# Patient Record
Sex: MALE | Race: WHITE | NOT HISPANIC OR LATINO
[De-identification: names, ages, dates, MRNs, and addresses within clinical notes are randomized per-mention and may not be internally consistent; named-entity substitution may affect disease eponyms.]

---

## 2018-03-20 PROBLEM — Z00.00 ENCOUNTER FOR PREVENTIVE HEALTH EXAMINATION: Status: ACTIVE | Noted: 2018-03-20

## 2018-03-28 ENCOUNTER — APPOINTMENT (OUTPATIENT)
Dept: NEPHROLOGY | Facility: CLINIC | Age: 63
End: 2018-03-28

## 2021-03-13 ENCOUNTER — NON-APPOINTMENT (OUTPATIENT)
Age: 66
End: 2021-03-13

## 2024-01-25 ENCOUNTER — NON-APPOINTMENT (OUTPATIENT)
Age: 69
End: 2024-01-25

## 2024-02-01 ENCOUNTER — NON-APPOINTMENT (OUTPATIENT)
Age: 69
End: 2024-02-01

## 2024-02-05 ENCOUNTER — NON-APPOINTMENT (OUTPATIENT)
Age: 69
End: 2024-02-05

## 2024-02-05 ENCOUNTER — APPOINTMENT (OUTPATIENT)
Dept: SURGICAL ONCOLOGY | Facility: CLINIC | Age: 69
End: 2024-02-05
Payer: COMMERCIAL

## 2024-02-05 VITALS
WEIGHT: 203 LBS | DIASTOLIC BLOOD PRESSURE: 74 MMHG | HEIGHT: 64 IN | OXYGEN SATURATION: 98 % | SYSTOLIC BLOOD PRESSURE: 145 MMHG | BODY MASS INDEX: 34.66 KG/M2 | RESPIRATION RATE: 17 BRPM | HEART RATE: 69 BPM

## 2024-02-05 DIAGNOSIS — D03.4 MELANOMA IN SITU OF SCALP AND NECK: ICD-10-CM

## 2024-02-05 PROCEDURE — 99205 OFFICE O/P NEW HI 60 MIN: CPT

## 2024-02-15 ENCOUNTER — OUTPATIENT (OUTPATIENT)
Dept: OUTPATIENT SERVICES | Facility: HOSPITAL | Age: 69
LOS: 1 days | End: 2024-02-15
Payer: COMMERCIAL

## 2024-02-15 ENCOUNTER — APPOINTMENT (OUTPATIENT)
Dept: PLASTIC SURGERY | Facility: CLINIC | Age: 69
End: 2024-02-15

## 2024-02-15 ENCOUNTER — RESULT REVIEW (OUTPATIENT)
Age: 69
End: 2024-02-15

## 2024-02-15 VITALS
DIASTOLIC BLOOD PRESSURE: 70 MMHG | OXYGEN SATURATION: 96 % | RESPIRATION RATE: 16 BRPM | HEIGHT: 64 IN | SYSTOLIC BLOOD PRESSURE: 144 MMHG | TEMPERATURE: 96.8 F | WEIGHT: 201 LBS | BODY MASS INDEX: 34.31 KG/M2 | HEART RATE: 72 BPM

## 2024-02-15 DIAGNOSIS — Z83.3 FAMILY HISTORY OF DIABETES MELLITUS: ICD-10-CM

## 2024-02-15 DIAGNOSIS — C80.1 MALIGNANT (PRIMARY) NEOPLASM, UNSPECIFIED: ICD-10-CM

## 2024-02-15 PROCEDURE — 88341 IMHCHEM/IMCYTCHM EA ADD ANTB: CPT | Mod: 26,59

## 2024-02-15 PROCEDURE — 88342 IMHCHEM/IMCYTCHM 1ST ANTB: CPT | Mod: 26,59

## 2024-02-15 PROCEDURE — 88323 CONSLTJ&REPRT MATRL PREP SLD: CPT | Mod: 26

## 2024-02-15 PROCEDURE — XXXXX: CPT | Mod: 1L

## 2024-02-15 PROCEDURE — 88323 CONSLTJ&REPRT MATRL PREP SLD: CPT

## 2024-02-15 PROCEDURE — 88341 IMHCHEM/IMCYTCHM EA ADD ANTB: CPT

## 2024-02-16 ENCOUNTER — OUTPATIENT (OUTPATIENT)
Dept: OUTPATIENT SERVICES | Facility: HOSPITAL | Age: 69
LOS: 1 days | End: 2024-02-16
Payer: COMMERCIAL

## 2024-02-16 VITALS
HEART RATE: 72 BPM | SYSTOLIC BLOOD PRESSURE: 150 MMHG | HEIGHT: 64 IN | WEIGHT: 205.69 LBS | TEMPERATURE: 98 F | OXYGEN SATURATION: 98 % | RESPIRATION RATE: 18 BRPM | DIASTOLIC BLOOD PRESSURE: 80 MMHG

## 2024-02-16 DIAGNOSIS — Z98.49 CATARACT EXTRACTION STATUS, UNSPECIFIED EYE: Chronic | ICD-10-CM

## 2024-02-16 DIAGNOSIS — Z98.890 OTHER SPECIFIED POSTPROCEDURAL STATES: Chronic | ICD-10-CM

## 2024-02-16 DIAGNOSIS — Z01.818 ENCOUNTER FOR OTHER PREPROCEDURAL EXAMINATION: ICD-10-CM

## 2024-02-16 DIAGNOSIS — C43.9 MALIGNANT MELANOMA OF SKIN, UNSPECIFIED: ICD-10-CM

## 2024-02-16 DIAGNOSIS — Z94.0 KIDNEY TRANSPLANT STATUS: Chronic | ICD-10-CM

## 2024-02-16 DIAGNOSIS — D03.4 MELANOMA IN SITU OF SCALP AND NECK: ICD-10-CM

## 2024-02-16 LAB
A1C WITH ESTIMATED AVERAGE GLUCOSE RESULT: 6.3 % — HIGH (ref 4–5.6)
ALBUMIN SERPL ELPH-MCNC: 3.5 G/DL — SIGNIFICANT CHANGE UP (ref 3.3–5)
ALP SERPL-CCNC: 85 U/L — SIGNIFICANT CHANGE UP (ref 40–120)
ALT FLD-CCNC: 21 U/L — SIGNIFICANT CHANGE UP (ref 10–45)
ANION GAP SERPL CALC-SCNC: 6 MMOL/L — SIGNIFICANT CHANGE UP (ref 5–17)
AST SERPL-CCNC: 14 U/L — SIGNIFICANT CHANGE UP (ref 10–40)
BASOPHILS # BLD AUTO: 0.03 K/UL — SIGNIFICANT CHANGE UP (ref 0–0.2)
BASOPHILS NFR BLD AUTO: 1.1 % — SIGNIFICANT CHANGE UP (ref 0–2)
BILIRUB SERPL-MCNC: 0.9 MG/DL — SIGNIFICANT CHANGE UP (ref 0.2–1.2)
BUN SERPL-MCNC: 29 MG/DL — HIGH (ref 7–23)
CALCIUM SERPL-MCNC: 9.2 MG/DL — SIGNIFICANT CHANGE UP (ref 8.4–10.5)
CHLORIDE SERPL-SCNC: 107 MMOL/L — SIGNIFICANT CHANGE UP (ref 96–108)
CO2 SERPL-SCNC: 28 MMOL/L — SIGNIFICANT CHANGE UP (ref 22–31)
CREAT SERPL-MCNC: 1.59 MG/DL — HIGH (ref 0.5–1.3)
EGFR: 47 ML/MIN/1.73M2 — LOW
EOSINOPHIL # BLD AUTO: 0.13 K/UL — SIGNIFICANT CHANGE UP (ref 0–0.5)
EOSINOPHIL NFR BLD AUTO: 4.7 % — SIGNIFICANT CHANGE UP (ref 0–6)
ESTIMATED AVERAGE GLUCOSE: 134 MG/DL — HIGH (ref 68–114)
GLUCOSE SERPL-MCNC: 158 MG/DL — HIGH (ref 70–99)
HCT VFR BLD CALC: 30.9 % — LOW (ref 39–50)
HGB BLD-MCNC: 10.6 G/DL — LOW (ref 13–17)
IMM GRANULOCYTES NFR BLD AUTO: 0 % — SIGNIFICANT CHANGE UP (ref 0–0.9)
LYMPHOCYTES # BLD AUTO: 1.18 K/UL — SIGNIFICANT CHANGE UP (ref 1–3.3)
LYMPHOCYTES # BLD AUTO: 42.6 % — SIGNIFICANT CHANGE UP (ref 13–44)
MANUAL SMEAR VERIFICATION: SIGNIFICANT CHANGE UP
MCHC RBC-ENTMCNC: 30.7 PG — SIGNIFICANT CHANGE UP (ref 27–34)
MCHC RBC-ENTMCNC: 34.3 GM/DL — SIGNIFICANT CHANGE UP (ref 32–36)
MCV RBC AUTO: 89.6 FL — SIGNIFICANT CHANGE UP (ref 80–100)
MONOCYTES # BLD AUTO: 0.25 K/UL — SIGNIFICANT CHANGE UP (ref 0–0.9)
MONOCYTES NFR BLD AUTO: 9 % — SIGNIFICANT CHANGE UP (ref 2–14)
NEUTROPHILS # BLD AUTO: 1.18 K/UL — LOW (ref 1.8–7.4)
NEUTROPHILS NFR BLD AUTO: 42.6 % — LOW (ref 43–77)
NRBC # BLD: 0 /100 WBCS — SIGNIFICANT CHANGE UP (ref 0–0)
NRBC # BLD: 0 /100 WBCS — SIGNIFICANT CHANGE UP (ref 0–0)
PLAT MORPH BLD: NORMAL — SIGNIFICANT CHANGE UP
PLATELET # BLD AUTO: 149 K/UL — LOW (ref 150–400)
POTASSIUM SERPL-MCNC: 4.5 MMOL/L — SIGNIFICANT CHANGE UP (ref 3.5–5.3)
POTASSIUM SERPL-SCNC: 4.5 MMOL/L — SIGNIFICANT CHANGE UP (ref 3.5–5.3)
PROT SERPL-MCNC: 6.9 G/DL — SIGNIFICANT CHANGE UP (ref 6–8.3)
RBC # BLD: 3.45 M/UL — LOW (ref 4.2–5.8)
RBC # FLD: 15 % — HIGH (ref 10.3–14.5)
RBC BLD AUTO: NORMAL — SIGNIFICANT CHANGE UP
SODIUM SERPL-SCNC: 141 MMOL/L — SIGNIFICANT CHANGE UP (ref 135–145)
WBC # BLD: 2.77 K/UL — LOW (ref 3.8–10.5)
WBC # FLD AUTO: 2.77 K/UL — LOW (ref 3.8–10.5)

## 2024-02-16 PROCEDURE — 80053 COMPREHEN METABOLIC PANEL: CPT

## 2024-02-16 PROCEDURE — G0463: CPT

## 2024-02-16 PROCEDURE — 83036 HEMOGLOBIN GLYCOSYLATED A1C: CPT

## 2024-02-16 PROCEDURE — 36415 COLL VENOUS BLD VENIPUNCTURE: CPT

## 2024-02-16 PROCEDURE — 85025 COMPLETE CBC W/AUTO DIFF WBC: CPT

## 2024-02-16 NOTE — H&P PST ADULT - NSICDXPASTSURGICALHX_GEN_ALL_CORE_FT
PAST SURGICAL HISTORY:  History of cataract surgery     History of common carotid artery stent placement     Kidney transplant recipient

## 2024-02-16 NOTE — H&P PST ADULT - NSICDXFAMILYHX_GEN_ALL_CORE_FT
FAMILY HISTORY:  Mother  Still living? No  FH: HTN (hypertension), Age at diagnosis: Age Unknown  FH: type 2 diabetes, Age at diagnosis: Age Unknown    Sibling  Still living? Yes, Estimated age: Age Unknown  FH: HTN (hypertension), Age at diagnosis: Age Unknown  FH: type 2 diabetes, Age at diagnosis: Age Unknown

## 2024-02-16 NOTE — H&P PST ADULT - NSICDXPASTMEDICALHX_GEN_ALL_CORE_FT
PAST MEDICAL HISTORY:  CAD (coronary artery disease)     ESRD (end stage renal disease)     GERD (gastroesophageal reflux disease)     HTN (hypertension)     Hypercholesteremia     MM (malignant melanoma)     Type II diabetes mellitus

## 2024-02-16 NOTE — H&P PST ADULT - PROBLEM SELECTOR PLAN 1
Melanoma lesion noted behind right ear. Labs, nephrology clearance, cardiac clearance, and medical clearance.

## 2024-02-16 NOTE — H&P PST ADULT - HISTORY OF PRESENT ILLNESS
69 y/o presents for PST. Per patient he had routine visit with dermatologist and lesion was noted posterior right ear. Biopsy done of site and resulted melanoma. Patient denies any pain or discomfort at this present time. Patient is schedule for wide excision of melanoma.

## 2024-02-16 NOTE — H&P PST ADULT - ATTENDING COMMENTS
68-year-old man.    Renal transplant Recipient.    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    Scheduled for wide excision with reconstruction by plastics (Dr. Dev Morgan).    oncologic diagnosis, surgical approach, risks, benefits and possible surgical outcomes reviewed in detail, all questions answered.    Consent on chart

## 2024-02-20 ENCOUNTER — TRANSCRIPTION ENCOUNTER (OUTPATIENT)
Age: 69
End: 2024-02-20

## 2024-02-20 NOTE — ASU DISCHARGE PLAN (ADULT/PEDIATRIC) - ASU DC SPECIAL INSTRUCTIONSFT
Initial followup with plastic surgery in the next 5-15 days, regarding matters of bandages, activities, and showering.    Dr. Sewell should call with pathology report in approximately 2 weeks.  The conversation will determine further management. Please keep the surgical dressing clean, dry, and in place for 3 days.  After 3 days you may remove the outer dressing and shower but please do not scrub or rub directly over the operative site and pat dry. After you remove the dressing please apply a thin layer of bacitracin over the incision daily.    Dr. Sewell should call with pathology report in approximately 2 weeks.  The conversation will determine further management.

## 2024-02-20 NOTE — ASU DISCHARGE PLAN (ADULT/PEDIATRIC) - PROCEDURE
Wide excision of melanoma from behind the right ear with plastics closure Wide excision of melanoma from behind the right ear with plastic surgery closure

## 2024-02-20 NOTE — ASU DISCHARGE PLAN (ADULT/PEDIATRIC) - CARE PROVIDER_API CALL
Dev Morgan)  Plastic Surgery  [address] [place] [address], [address]  [place] [address]  Phone: [phone]  Fax: [phone]  Follow Up Time: 1 week

## 2024-02-20 NOTE — ASU DISCHARGE PLAN (ADULT/PEDIATRIC) - NURSING INSTRUCTIONS
Drink plenty of fluids. Follow up with MD for post op appt. Take Tylenol for mild or moderate pain/discomfort. Take Oxycodone for severe pain. Drink plenty of fluids. Follow up with MD for post op appt.

## 2024-02-20 NOTE — ASU PATIENT PROFILE, ADULT - MENTAL HEALTH CONDITIONS/SYMPTOMS, PROFILE
Group Topic:  Substance Abuse Process Group    Date: 10/29/2023  Start Time: 0900  End Time: 1430  Facilitators: Andres Whalen LCSW; Prabhakar Santos LPC    Method: Group  Participation: Active  Patient report of any safety concerns: No  Physical concerns reported: No  Drugs/alcohol reported since last session: No  Mood: Appropriate  Affect: Normal  Thought Process: Congruent  Perceptual Problems: None  Speech: Clear/articulate  Behavior/Socialization: Appropriate to Group  Task Performance: Follows directions.  Patient Response: Attentive and Appropriate feedback    Special Note: [facility] program also provides supplemental education on anxiety and depression and its impact on substance use, thoughts, activities, people interactions and mood.    Ancillary Content: None    Group Type: TALI Process Group with incorporation of Cognitive Behavioral Therapy (CBT)  Seeking Safety Content: N/A  Group Handouts: None  Start Time: 1000  End Time:  1100  Attendance: Billable. 45 minutes or more    Number of group participants: 13  Individual Response to Group: Patient check-in with Prabhakar.  Patients shared about the past week- “Low-lights, High-lights and worries”.  Then, transition to CBT with diagram of the model.  Interactive approach with patients sharing among the group examples of thoughts, mood, cravings and actions.      Group Type: TALI Education   Bright Content: Session 1: Thoughts Can Be Harmful Or Helpful  Group Handouts: Thoughts, Alcohol/Drug Use, and Your Mood, Session 1  Start Time: 1100  End Time:  1145  Attendance: Billable.  45 minutes or more    Number of group participants: 13  Individual Response to Group: Continuation of CBT utilizing Bright Session 1: Patients formed 2 people groups and shared with each other examples of harmful thoughts and examples of helpful thoughts.  They returned as a group and discussed results amongst each other.    Group Type: TALI Activity   Seeking Safety Content:  N/A  Seeking Safety Content: N/A  Group Handouts: None  Start Time: 1300  End Time:  1430  Attendance: Billable.  45 minutes or more    Number of group participants: 13  Individual Response to Group: Watching  game with peers without their usual substance.  Count the number of beer ads during the game.    GAD7 Score: Non-scoring day   PHQ9 Score: Non-scoring day     Patient warrants continued treatment at the RTC level of care in order to continue to monitor symptoms and treatment outcomes related to unchanged treatment plan and identify effective coping techniques to manage symptoms and treatment outcomes accordingly.  Patient's overall functioning is still impacted by current mental health/TALI challenges as evidenced by unchanged treatment plan.    Kalpesh Whalen, TARYN, [facility]   none

## 2024-02-20 NOTE — ASU DISCHARGE PLAN (ADULT/PEDIATRIC) - NS MD DC FALL RISK RISK
For information on Fall & Injury Prevention, visit: [email or web address] OR  [email or web address] OR  https://www.cdc.gov/steadi/patient.html

## 2024-02-20 NOTE — ASU PATIENT PROFILE, ADULT - FALL HARM RISK - HARM RISK INTERVENTIONS

## 2024-02-21 ENCOUNTER — APPOINTMENT (OUTPATIENT)
Dept: SURGICAL ONCOLOGY | Facility: HOSPITAL | Age: 69
End: 2024-02-21

## 2024-02-21 ENCOUNTER — APPOINTMENT (OUTPATIENT)
Dept: PLASTIC SURGERY | Facility: HOSPITAL | Age: 69
End: 2024-02-21

## 2024-02-21 ENCOUNTER — OUTPATIENT (OUTPATIENT)
Dept: OUTPATIENT SERVICES | Facility: HOSPITAL | Age: 69
LOS: 1 days | End: 2024-02-21
Payer: COMMERCIAL

## 2024-02-21 ENCOUNTER — RESULT REVIEW (OUTPATIENT)
Age: 69
End: 2024-02-21

## 2024-02-21 VITALS
WEIGHT: 205.69 LBS | OXYGEN SATURATION: 98 % | RESPIRATION RATE: 14 BRPM | HEART RATE: 70 BPM | SYSTOLIC BLOOD PRESSURE: 142 MMHG | DIASTOLIC BLOOD PRESSURE: 72 MMHG | HEIGHT: 64 IN | TEMPERATURE: 97 F

## 2024-02-21 VITALS
SYSTOLIC BLOOD PRESSURE: 126 MMHG | DIASTOLIC BLOOD PRESSURE: 63 MMHG | HEART RATE: 69 BPM | OXYGEN SATURATION: 96 % | TEMPERATURE: 98 F | RESPIRATION RATE: 16 BRPM

## 2024-02-21 DIAGNOSIS — Z98.890 OTHER SPECIFIED POSTPROCEDURAL STATES: Chronic | ICD-10-CM

## 2024-02-21 DIAGNOSIS — Z94.0 KIDNEY TRANSPLANT STATUS: Chronic | ICD-10-CM

## 2024-02-21 DIAGNOSIS — Z98.49 CATARACT EXTRACTION STATUS, UNSPECIFIED EYE: Chronic | ICD-10-CM

## 2024-02-21 DIAGNOSIS — D03.4 MELANOMA IN SITU OF SCALP AND NECK: ICD-10-CM

## 2024-02-21 LAB — GLUCOSE BLDC GLUCOMTR-MCNC: 133 MG/DL — HIGH (ref 70–99)

## 2024-02-21 PROCEDURE — 88305 TISSUE EXAM BY PATHOLOGIST: CPT | Mod: 26

## 2024-02-21 PROCEDURE — 11626 EXC S/N/H/F/G MAL+MRG >4 CM: CPT

## 2024-02-21 PROCEDURE — 88342 IMHCHEM/IMCYTCHM 1ST ANTB: CPT

## 2024-02-21 PROCEDURE — 88305 TISSUE EXAM BY PATHOLOGIST: CPT

## 2024-02-21 PROCEDURE — C1889: CPT

## 2024-02-21 PROCEDURE — 88342 IMHCHEM/IMCYTCHM 1ST ANTB: CPT | Mod: 26

## 2024-02-21 PROCEDURE — 82962 GLUCOSE BLOOD TEST: CPT

## 2024-02-21 PROCEDURE — 88341 IMHCHEM/IMCYTCHM EA ADD ANTB: CPT

## 2024-02-21 PROCEDURE — 15733 MUSC MYOQ/FSCQ FLP H&N PEDCL: CPT

## 2024-02-21 PROCEDURE — 88341 IMHCHEM/IMCYTCHM EA ADD ANTB: CPT | Mod: 26

## 2024-02-21 PROCEDURE — 15733 MUSC MYOQ/FSCQ FLP H&N PEDCL: CPT | Mod: XP

## 2024-02-21 DEVICE — ARISTA 3GR: Type: IMPLANTABLE DEVICE | Site: RIGHT | Status: FUNCTIONAL

## 2024-02-21 RX ORDER — SODIUM BICARBONATE 1 MEQ/ML
2 SYRINGE (ML) INTRAVENOUS
Refills: 0 | DISCHARGE

## 2024-02-21 RX ORDER — VALGANCICLOVIR 450 MG/1
1 TABLET, FILM COATED ORAL
Refills: 0 | DISCHARGE

## 2024-02-21 RX ORDER — ROSUVASTATIN CALCIUM 5 MG/1
1 TABLET ORAL
Refills: 0 | DISCHARGE

## 2024-02-21 RX ORDER — HYDRALAZINE HCL 50 MG
1 TABLET ORAL
Refills: 0 | DISCHARGE

## 2024-02-21 RX ORDER — ATOVAQUONE 750 MG/5ML
10 SUSPENSION ORAL
Refills: 0 | DISCHARGE

## 2024-02-21 RX ORDER — HEPARIN SODIUM 5000 [USP'U]/ML
5000 INJECTION INTRAVENOUS; SUBCUTANEOUS ONCE
Refills: 0 | Status: COMPLETED | OUTPATIENT
Start: 2024-02-21 | End: 2024-02-21

## 2024-02-21 RX ORDER — METOPROLOL TARTRATE 50 MG
1 TABLET ORAL
Refills: 0 | DISCHARGE

## 2024-02-21 RX ORDER — ONDANSETRON 8 MG/1
4 TABLET, FILM COATED ORAL ONCE
Refills: 0 | Status: DISCONTINUED | OUTPATIENT
Start: 2024-02-21 | End: 2024-02-21

## 2024-02-21 RX ORDER — SODIUM CHLORIDE 9 MG/ML
1000 INJECTION, SOLUTION INTRAVENOUS
Refills: 0 | Status: DISCONTINUED | OUTPATIENT
Start: 2024-02-21 | End: 2024-02-21

## 2024-02-21 RX ORDER — ASPIRIN/CALCIUM CARB/MAGNESIUM 324 MG
0 TABLET ORAL
Refills: 0 | DISCHARGE

## 2024-02-21 RX ORDER — AMLODIPINE BESYLATE 2.5 MG/1
2 TABLET ORAL
Refills: 0 | DISCHARGE

## 2024-02-21 RX ORDER — PANTOPRAZOLE SODIUM 20 MG/1
1 TABLET, DELAYED RELEASE ORAL
Refills: 0 | DISCHARGE

## 2024-02-21 RX ORDER — INSULIN GLARGINE 100 [IU]/ML
18 INJECTION, SOLUTION SUBCUTANEOUS
Refills: 0 | DISCHARGE

## 2024-02-21 RX ORDER — TACROLIMUS 5 MG/1
3 CAPSULE ORAL
Refills: 0 | DISCHARGE

## 2024-02-21 RX ORDER — HYDROMORPHONE HYDROCHLORIDE 2 MG/ML
0.5 INJECTION INTRAMUSCULAR; INTRAVENOUS; SUBCUTANEOUS ONCE
Refills: 0 | Status: DISCONTINUED | OUTPATIENT
Start: 2024-02-21 | End: 2024-02-21

## 2024-02-21 RX ORDER — OXYCODONE HYDROCHLORIDE 5 MG/1
5 TABLET ORAL EVERY 6 HOURS
Refills: 0 | Status: DISCONTINUED | OUTPATIENT
Start: 2024-02-21 | End: 2024-02-21

## 2024-02-21 RX ORDER — SEMAGLUTIDE 0.68 MG/ML
0.5 INJECTION, SOLUTION SUBCUTANEOUS
Refills: 0 | DISCHARGE

## 2024-02-21 RX ORDER — CHOLECALCIFEROL (VITAMIN D3) 125 MCG
1 CAPSULE ORAL
Refills: 0 | DISCHARGE

## 2024-02-21 RX ORDER — SIROLIMUS 1 MG/ML
1 SOLUTION ORAL
Refills: 0 | DISCHARGE

## 2024-02-21 RX ORDER — ASCORBIC ACID 60 MG
1 TABLET,CHEWABLE ORAL
Refills: 0 | DISCHARGE

## 2024-02-21 RX ORDER — OXYCODONE HYDROCHLORIDE 5 MG/1
1 TABLET ORAL
Qty: 10 | Refills: 0
Start: 2024-02-21

## 2024-02-21 RX ADMIN — HEPARIN SODIUM 5000 UNIT(S): 5000 INJECTION INTRAVENOUS; SUBCUTANEOUS at 07:09

## 2024-02-21 NOTE — BRIEF OPERATIVE NOTE - NSICDXBRIEFPREOP_GEN_ALL_CORE_FT
PRE-OP DIAGNOSIS:  Melanoma in situ 21-Feb-2024 08:35:22  Garrison Fox  
PRE-OP DIAGNOSIS:  Melanoma in situ 21-Feb-2024 08:35:22  Garrison Fox

## 2024-02-21 NOTE — BRIEF OPERATIVE NOTE - OPERATION/FINDINGS
rhomboid flap for closure of post auricular melanoma in situ defect.  closed in layers
see operative report 
show

## 2024-02-21 NOTE — BRIEF OPERATIVE NOTE - NSICDXBRIEFPROCEDURE_GEN_ALL_CORE_FT
PROCEDURES:  Adjacent tissue transfer for complicated defect 21-Feb-2024 08:35:13  Garrison Fox  
PROCEDURES:  Excision, lesion, malignant, neck, greater than 4.0 cm in diameter including margins 21-Feb-2024 08:49:24  Valentine Jeffries

## 2024-02-26 LAB — SURGICAL PATHOLOGY STUDY: SIGNIFICANT CHANGE UP

## 2024-02-29 ENCOUNTER — APPOINTMENT (OUTPATIENT)
Dept: PLASTIC SURGERY | Facility: CLINIC | Age: 69
End: 2024-02-29

## 2024-02-29 VITALS
OXYGEN SATURATION: 97 % | WEIGHT: 201 LBS | DIASTOLIC BLOOD PRESSURE: 66 MMHG | SYSTOLIC BLOOD PRESSURE: 135 MMHG | HEIGHT: 64 IN | TEMPERATURE: 98.4 F | BODY MASS INDEX: 34.31 KG/M2 | HEART RATE: 69 BPM

## 2024-02-29 PROBLEM — E78.00 PURE HYPERCHOLESTEROLEMIA, UNSPECIFIED: Chronic | Status: ACTIVE | Noted: 2024-02-16

## 2024-02-29 PROBLEM — E11.9 TYPE 2 DIABETES MELLITUS WITHOUT COMPLICATIONS: Chronic | Status: ACTIVE | Noted: 2024-02-16

## 2024-02-29 PROBLEM — C43.9 MALIGNANT MELANOMA OF SKIN, UNSPECIFIED: Chronic | Status: ACTIVE | Noted: 2024-02-16

## 2024-02-29 PROBLEM — N18.6 END STAGE RENAL DISEASE: Chronic | Status: ACTIVE | Noted: 2024-02-16

## 2024-02-29 PROBLEM — I10 ESSENTIAL (PRIMARY) HYPERTENSION: Chronic | Status: ACTIVE | Noted: 2024-02-16

## 2024-02-29 PROBLEM — K21.9 GASTRO-ESOPHAGEAL REFLUX DISEASE WITHOUT ESOPHAGITIS: Chronic | Status: ACTIVE | Noted: 2024-02-16

## 2024-02-29 PROBLEM — I25.10 ATHEROSCLEROTIC HEART DISEASE OF NATIVE CORONARY ARTERY WITHOUT ANGINA PECTORIS: Chronic | Status: ACTIVE | Noted: 2024-02-16

## 2024-02-29 NOTE — ASSESSMENT
[FreeTextEntry1] : 68 year old male with s/p wide excision of sin and soft tissue of melanoma from behind the right ear with plastics closure. DOS: 2/21/24. Incision healing well.  PLAN: Ongoing plan of care discussed with patient.

## 2024-02-29 NOTE — HISTORY OF PRESENT ILLNESS
[FreeTextEntry1] : Mr. Alejo is a 68 year old male with s/p wide excision of sin and soft tissue of melanoma from behind the right ear with plastics closure. DOS: 2/21/24.

## 2024-03-01 LAB — SURGICAL PATHOLOGY STUDY: SIGNIFICANT CHANGE UP

## 2024-03-07 ENCOUNTER — APPOINTMENT (OUTPATIENT)
Dept: PLASTIC SURGERY | Facility: CLINIC | Age: 69
End: 2024-03-07

## 2024-03-07 VITALS
HEART RATE: 74 BPM | BODY MASS INDEX: 34.31 KG/M2 | OXYGEN SATURATION: 98 % | WEIGHT: 201 LBS | TEMPERATURE: 98 F | SYSTOLIC BLOOD PRESSURE: 150 MMHG | HEIGHT: 64 IN | DIASTOLIC BLOOD PRESSURE: 75 MMHG

## 2024-03-12 ENCOUNTER — NON-APPOINTMENT (OUTPATIENT)
Age: 69
End: 2024-03-12

## 2024-03-12 NOTE — REASON FOR VISIT
[Initial Consultation] : an initial consultation for [Other: _____] : [unfilled] [FreeTextEntry2] : melanoma in situ of the right retro-auricular region.

## 2024-03-12 NOTE — REVIEW OF SYSTEMS
[Negative] : Musculoskeletal [FreeTextEntry3] : History of cataracts [FreeTextEntry7] : Allergic to penicillin [FreeTextEntry9] : Chronic renal failure [de-identified] : Melanoma [de-identified] : Diabetes [FreeTextEntry1] : August 2023 renal transplant

## 2024-03-12 NOTE — ASSESSMENT
[FreeTextEntry1] : 68-year-old man.  Newly diagnosed right retro-auricular melanoma in situ.  + Chronic immunosuppression for recent renal transplantation.  + Diabetes.  Oncologic indications, operative approach, risk, benefits, alternatives, possible surgical outcomes from excision as an outpatient surgical procedure, coordinate with plastic surgery, we reviewed with him in detail and all of his questions were answered. He understands and like to proceed with surgery.   Paperwork for scheduling submitted.  Note dictated to his physicians.   3/12/2024: I called him but had to leave a voicemail. February 21, 2024, he had wide excision of a melanoma in situ from behind the right ear. Plastics: Dr. Dev Morgan. Surgical pathology: + Residual in situ melanoma. No invasive component. Negative margins.  Presently, no further intervention is warranted.  Note dictated to his referring physicians.

## 2024-03-12 NOTE — PHYSICAL EXAM
[Normal] : supple, no neck mass and thyroid not enlarged [Normal Neck Lymph Nodes] : normal neck lymph nodes  [Normal Supraclavicular Lymph Nodes] : normal supraclavicular lymph nodes [Normal Axillary Lymph Nodes] : normal axillary lymph nodes [Normal] : full range of motion and no deformities appreciated [de-identified] : Groins not examined [de-identified] : See diagram

## 2024-03-12 NOTE — HISTORY OF PRESENT ILLNESS
[de-identified] : 68-year-old man.  + Chronic immunosuppression secondary to RENAL TRANSPLANT in August 2023.  Referred by Dr. Suni MARCELO, advanced dermatology.  CC: Newly diagnosed melanoma in situ RIGHT RETRO-AURICULAR.  This was an asymptomatic pigmented lesion that a friend noted in October 2023. Once the patient was aware of it, he would look at it in the mirror. It gradually became darker and larger and occasionally itched prompting dermatologic evaluation, biopsy, and the above diagnosis.   + Prior personal history: ~1975: Wide excision of the "skin cancer" on the left lower back at [facility]. No further details are available.  No other personal history of malignancy.   No relatives with skin cancer.  His father had a malignancy, but no further details are available. A sister had ovarian cancer.   + Renal transplant at [facility] in August 2023. Transplant surgeon, Dr. Harjit WHITNEY.  Current immunosuppression regimen: CellCept (mycophenolic acid). Tacrolimus. Valcyte. Atovaquone.   Derm: Dr. Suni MARCELO.   PMD: Dr. Duran RESENDEZ.  + ALLERGIC: Penicillin.  No pacemaker or defibrillator. No anticoagulants.  +81 mg aspirin daily.  + CAD. February 2022: PTCA with stent placement at [facility].  Cardiology: Dr. Trevor CHAVEZ.  + Hypertension. Treated with amlodipine, hydralazine, and metoprolol.  + Hypercholesterolemia. He takes daily rosuvastatin.  + DIABETES. Endocrinology: Dr. Jayesh CH. Current regimen: Ozempic and insulin.   August 2023, he has staged bilateral cataract operations. [facility] [place]. No additional details available.   2020 colonoscopy by Dr. RIOS: Alicia x 10 years.

## 2024-07-15 ENCOUNTER — APPOINTMENT (OUTPATIENT)
Dept: SURGICAL ONCOLOGY | Facility: CLINIC | Age: 69
End: 2024-07-15

## 2024-07-15 DIAGNOSIS — D03.4 MELANOMA IN SITU OF SCALP AND NECK: ICD-10-CM

## 2024-12-10 NOTE — ASU PATIENT PROFILE, ADULT - BLOOD AVOIDANCE/RESTRICTIONS, PROFILE
none Chemical Peel: TCA 35% Consent: Prior to the procedure, written consent was obtained and risks were reviewed, including but not limited to: redness, peeling, blistering, pigmentary change, scarring, infection, and pain. Treatment Number: 0 Detail Level: Zone Total Number Of Aks Treated: 17 Post-Care Instructions: I reviewed with the patient in detail post-care instructions. Patient should avoid sun exposure and wear sun protection. Time (Mins): 3 Endpoint: light frosting

## 2024-12-18 NOTE — ASU PATIENT PROFILE, ADULT - NSCAFFEINETYPE_GEN_ALL_CORE_SD
Health Maintenance       Shingles Vaccine (1 of 2)  Never done    COVID-19 Vaccine (6 - 2024-25 season)  Overdue since 9/1/2024    Traditional Medicare- Medicare Wellness Visit (Yearly)  Due soon on 6/1/2025           Following review of the above:  Patient is not proceeding with: COVID-19 and Shingles    Note: Refer to final orders and clinician documentation.        coffee
